# Patient Record
Sex: FEMALE | Race: WHITE | URBAN - METROPOLITAN AREA
[De-identification: names, ages, dates, MRNs, and addresses within clinical notes are randomized per-mention and may not be internally consistent; named-entity substitution may affect disease eponyms.]

---

## 2017-03-06 ENCOUNTER — IMPORTED ENCOUNTER (OUTPATIENT)
Dept: URBAN - METROPOLITAN AREA CLINIC 1 | Facility: CLINIC | Age: 67
End: 2017-03-06

## 2017-03-06 PROBLEM — H40.1132: Noted: 2017-03-06

## 2017-03-06 PROBLEM — H16.223: Noted: 2017-03-06

## 2017-03-06 PROBLEM — Z96.1: Noted: 2017-03-06

## 2017-03-06 PROCEDURE — 92083 EXTENDED VISUAL FIELD XM: CPT

## 2017-03-06 PROCEDURE — 92012 INTRM OPH EXAM EST PATIENT: CPT

## 2017-03-06 NOTE — PATIENT DISCUSSION
1.  Moderate Open Angle Glaucoma OU (CD 0.75/0.65) IOP stable on Latanoprost. VF shows no progression OU. Cont Latanoprost QHS OU. Compliance with meds. 2.  Keratoconjunctivitis Sicca OU (H/o Plugs LLs OU) Pt remains symptomatic despite Restasis use and ATs. Cont Refresh PM jyoti QHS OU. Cont ATs Q2hrs OU w/a (can also use Systane Gel gtts during the day) Could consider UL Microflow Plugs if patient remains symptomatic or if gel gtts do not improve symptoms. 3.  Pseudophakia OU - (Standard w/ LRI OU) 4. H/o HTN Retinopathy OU 5. H/o PVD OD Return for an appointment in 6 mo 30 OCT with Dr. Jina Ramos.

## 2017-09-15 ENCOUNTER — IMPORTED ENCOUNTER (OUTPATIENT)
Dept: URBAN - METROPOLITAN AREA CLINIC 1 | Facility: CLINIC | Age: 67
End: 2017-09-15

## 2017-09-15 PROBLEM — H16.223: Noted: 2017-09-15

## 2017-09-15 PROBLEM — H40.1132: Noted: 2017-09-15

## 2017-09-15 PROCEDURE — 92014 COMPRE OPH EXAM EST PT 1/>: CPT

## 2017-09-15 PROCEDURE — 92133 CPTRZD OPH DX IMG PST SGM ON: CPT

## 2017-09-15 PROCEDURE — 92015 DETERMINE REFRACTIVE STATE: CPT

## 2017-09-15 NOTE — PATIENT DISCUSSION
1.  Moderate Open Angle Glaucoma OU (CD 0.75/0.65) IOP stable on Latanoprost.OCT shows no progression OU. Cont Latanoprost QHS OU. Compliance with meds. 2.  Keratoconjunctivitis Sicca OU (H/o Plugs LLs OU) Cont Restasis BID OU. Cont Refresh PM jyoti QHS OU. Cont ATs Q2hrs OU w/a (can also use Systane Gel gtts during the day) Could consider UL Microflow Plugs if patient remains symptomatic or if gel gtts do not improve symptoms. 3.  Pseudophakia OU - (Standard w/ LRI OU) 4. GI Arth Vascular Disease- Stable observe. 5.  PVD OD - Stable RD precautions Letter to PCP Return for an appointment in 6 mo 10 VF 24-2 OU with Dr. Judy Juares.

## 2017-12-19 NOTE — PATIENT DISCUSSION
REFRACTIVE ERROR, OU - DISCUSSED OPTION OF CORRECTING AT THE TIME OF CATARACT SURGERY. PT UNDERSTANDS AND ELECTS TO CONSIDER HER OPTIONS.  DISCUSSED POSSIBLY BEING A CANDIDATE FOR THE PANOPTICS STUDY

## 2017-12-19 NOTE — PATIENT DISCUSSION
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 The possibility of the recurrence of the problem leading to permanent damage to the cornea and vision loss was also explained. The risks, benefits and alternatives of epilation were reviewed with the patient. The patient understands and wishes to proceed with epliation today.

## 2017-12-19 NOTE — PATIENT DISCUSSION
Surgery  Counseling: I have discussed the option of glasses versus cataract surgery versus following. It was explained that when vision no longer meets the patient's visual needs and a new prescription for glasses is not likely to improve the patient's visual symptoms, the option of cataract surgery is a reasonable next step. It was explained that there is no guarantee that removing the cataract will improve their visual symptoms, however; it is believed that the cataract is contributing to the patient's visual impairment and surgery may significantly improve both the visual and functional status of the patient. The risks, benefits and alternatives of surgery were discussed with the patient. After this discussion, the patient desires to proceed with cataract surgery with implantation of an intraocular lens to improve vision for reading small print, reducing glare while driving at night and being able to see street signs.

## 2018-01-18 NOTE — PATIENT DISCUSSION
Surgery  Counseling: I have discussed the option of glasses versus cataract surgery versus following. It was explained that when vision no longer meets the patient's visual needs and a new prescription for glasses is not likely to improve the patient's visual symptoms, the option of cataract surgery is a reasonable next step. It was explained that there is no guarantee that removing the cataract will improve their visual symptoms, however; it is believed that the cataract is contributing to the patient's visual impairment and surgery may significantly improve both the visual and functional status of the patient. The risks, benefits and alternatives of surgery were discussed with the patient. After this discussion, the patient desires to proceed with cataract surgery with implantation of an intraocular lens to improve vision to drive safely and read small print.

## 2018-03-16 ENCOUNTER — IMPORTED ENCOUNTER (OUTPATIENT)
Dept: URBAN - METROPOLITAN AREA CLINIC 1 | Facility: CLINIC | Age: 68
End: 2018-03-16

## 2018-03-16 PROBLEM — H16.223: Noted: 2018-03-16

## 2018-03-16 PROBLEM — H40.1132: Noted: 2018-03-16

## 2018-03-16 PROCEDURE — 92012 INTRM OPH EXAM EST PATIENT: CPT

## 2018-03-16 PROCEDURE — 92083 EXTENDED VISUAL FIELD XM: CPT

## 2018-03-16 NOTE — PATIENT DISCUSSION
1.  Moderate Open Angle Glaucoma OU (CD 0.75/0.65) IOP stable on Latanoprost. VF shows no progression OU. Cont Latanoprost QHS OU. Compliance with meds. 2.  Keratoconjunctivitis Sicca OU (H/o Plugs LLs OU) Cont Restasis BID OU. Cont Refresh PM jyoti QHS OU. Cont ATs Q2hrs OU w/a (can also use Systane Gel gtts during the day) 3. Pseudophakia OU - (Standard w/ LRI OU) 4. H/o GI Arth Vascular Disease 5. H/o PVD ODReturn for an appointment in 6 mo 27 OCT with Dr. Carole Ortega.

## 2018-09-17 ENCOUNTER — IMPORTED ENCOUNTER (OUTPATIENT)
Dept: URBAN - METROPOLITAN AREA CLINIC 1 | Facility: CLINIC | Age: 68
End: 2018-09-17

## 2018-09-17 PROBLEM — H35.013: Noted: 2018-09-17

## 2018-09-17 PROBLEM — H04.123: Noted: 2018-09-17

## 2018-09-17 PROBLEM — Z96.1: Noted: 2018-09-17

## 2018-09-17 PROBLEM — H16.143: Noted: 2018-09-17

## 2018-09-17 PROBLEM — H40.1132: Noted: 2018-09-17

## 2018-09-17 PROCEDURE — 92014 COMPRE OPH EXAM EST PT 1/>: CPT

## 2018-09-17 PROCEDURE — 92015 DETERMINE REFRACTIVE STATE: CPT

## 2018-09-17 PROCEDURE — 92133 CPTRZD OPH DX IMG PST SGM ON: CPT

## 2018-09-17 NOTE — PATIENT DISCUSSION
1.  Moderate Open Angle Glaucoma OU (0.75/0.65)- Stable IOP OU. Minimal thinning by OCT OU no progression. Patient to switch from Latanoprost to Travatan Z OU QHS due to insurance reasons. Patient advised to be compliant with gtts. Condition was discussed with patient and patient understands. Will continue to monitor patient for any progression in condition. Patient was advised to call us with any problems questions or concerns. 2.  CLAUDIA w/ PEK OU- Slight progression OU. The continuation of artificial tears OU Q2hwa were recommended. Plug intact LLs OU. Continue Restasis OU BID. Continue Refresh PM OSWALDO OU QHS. 3. GR I Athero Vascular Dz OU- Stable. Observe. 4.  Pseudophakia OU - Doing well. 5. Return for an appointment for 10/HVf in 6 months with Dr. Carole Ortega.

## 2019-02-19 NOTE — PATIENT DISCUSSION
Resume normal activity. Resume any medications that were discontinued for surgery. Artificial tears prn burning/itching/blurry vision. Wash incisions/ lash line once daily with baby shampoo. Sutures removed without difficulty. Discussed skin care and sun avoidance at length. Reviewed results of pathology report. Follow up in one month.

## 2019-03-18 ENCOUNTER — IMPORTED ENCOUNTER (OUTPATIENT)
Dept: URBAN - METROPOLITAN AREA CLINIC 1 | Facility: CLINIC | Age: 69
End: 2019-03-18

## 2019-03-18 PROBLEM — H16.143: Noted: 2019-03-18

## 2019-03-18 PROBLEM — H40.1132: Noted: 2019-03-18

## 2019-03-18 PROBLEM — Z96.1: Noted: 2019-03-18

## 2019-03-18 PROBLEM — H04.123: Noted: 2019-03-18

## 2019-03-18 PROCEDURE — 99213 OFFICE O/P EST LOW 20 MIN: CPT

## 2019-03-18 PROCEDURE — 92083 EXTENDED VISUAL FIELD XM: CPT

## 2019-03-18 NOTE — PATIENT DISCUSSION
1.  Moderate Open Angle Glaucoma OU (CD 0.75/0.65) - HVF shows non specific loss OU. IOP stable cont Latanoprost QHS OU. Patient advised to be compliant with gtts. Condition was discussed with patient and patient understands. Will continue to monitor patient for any progression in condition. Patient was advised to call us with any problems questions or concerns. 2.  CLAUDIA w/ PEK OU- Recommend PF Blink ATs TID-QID OU routinely and cont AT Gel QHS OU 3. Pseudophakia OU - Doing Well 4. H/o GI Arth Vascular Disease 5. H/o PVD ODReturn for an appointment in 6 months 30/OCT with Dr. Sriram Michelle.

## 2019-03-26 NOTE — PATIENT DISCUSSION
Patient is continuing to heal well following surgery. Reviewed skin care and sun avoidance. Eye cream given. Reviewed the results of the path report with the patient today. Will have patient follow up w/ Dr. Alexandria Flores to examine the LLL. Follow up prn.

## 2019-07-05 NOTE — PATIENT DISCUSSION
Patient is continuing to heal well following surgery. Reviewed skin care and sun avoidance. Follow up prn.

## 2019-09-20 ENCOUNTER — IMPORTED ENCOUNTER (OUTPATIENT)
Dept: URBAN - METROPOLITAN AREA CLINIC 1 | Facility: CLINIC | Age: 69
End: 2019-09-20

## 2019-09-20 PROBLEM — H35.013: Noted: 2019-09-20

## 2019-09-20 PROBLEM — H40.1132: Noted: 2019-09-20

## 2019-09-20 PROBLEM — H16.143: Noted: 2019-09-20

## 2019-09-20 PROBLEM — H43.813: Noted: 2019-09-20

## 2019-09-20 PROBLEM — H04.123: Noted: 2019-09-20

## 2019-09-20 PROBLEM — Z96.1: Noted: 2019-09-20

## 2019-09-20 PROCEDURE — 92015 DETERMINE REFRACTIVE STATE: CPT

## 2019-09-20 PROCEDURE — 92014 COMPRE OPH EXAM EST PT 1/>: CPT

## 2019-09-20 PROCEDURE — 92133 CPTRZD OPH DX IMG PST SGM ON: CPT

## 2019-09-20 NOTE — PATIENT DISCUSSION
1.  Moderate Open Angle Glaucoma OU -- (CD 0.75/0.65)  Stable IOP OU. Minimal thinning by OCT OU no progression. Continue Latanoprost QHS OU. Patient advised to be compliant with gtts. Condition was discussed with patient and patient understands. Will continue to monitor patient for any progression in condition. Patient was advised to call us with any problems questions or concerns. 2.  CLAUDIA w/ PEK OU -- Switching to PF ATs [id number] OU. Plug intact LLs OU. Continue Restasis OU BID. Recommend PM OSWALDO QHS OU. (Sample of PF Blink Refresh Celluvisc and PF Soothe XP)3. GR I Athero Vascular Dz OU- Stable. Observe. 4.  PVD OD -- Old stable. RD precautions. 5. Pseudophakia OU -- Doing well. Return for an appointment for 10/HVF in 6 months with Dr. Tyler Fernandez.

## 2020-07-13 ENCOUNTER — IMPORTED ENCOUNTER (OUTPATIENT)
Dept: URBAN - METROPOLITAN AREA CLINIC 1 | Facility: CLINIC | Age: 70
End: 2020-07-13

## 2020-07-13 PROBLEM — H40.1132: Noted: 2020-07-13

## 2020-07-13 PROCEDURE — 92012 INTRM OPH EXAM EST PATIENT: CPT

## 2020-07-13 PROCEDURE — 92083 EXTENDED VISUAL FIELD XM: CPT

## 2020-07-13 NOTE — PATIENT DISCUSSION
CLAUDIA w/ PEK OU - (LL plugs in place OU) Continue Restasis BID OU. Recommend PF ATs P2uunca OU. Recommend PM OSWALDO QHS OU. 3.  Pseudophakia OU - Doing well. 4. H/o GR I Athero Vascular Dz OU5. H/o PVD ODReturn for an appointment in 6 months 30/OCT with Dr. Tyler Fernandez.

## 2020-07-13 NOTE — PATIENT DISCUSSION
1.  Moderate Open Angle Glaucoma OU (CD 0.75/0.65) - HVF WNL OU. IOP stable cont Latanoprost QHS OU. Patient advised to be compliant with gtts. Condition was discussed with patient and patient understands. Will continue to monitor patient for any progression in condition. Patient was advised to call us with any problems questions or concerns. 2.  CLAUDIA w/ PEK OU - (LL plugs in place OU) Continue Restasis BID OU. Recommend PF ATs [id number] OU. Recommend PM OSWALDO QHS OU. 3.  Pseudophakia OU - Doing well. 4. H/o GR I Athero Vascular Dz OU5. H/o PVD ODReturn for an appointment in 6 months 30/OCT with Dr. Augustine Finley.

## 2021-01-26 ENCOUNTER — IMPORTED ENCOUNTER (OUTPATIENT)
Dept: URBAN - METROPOLITAN AREA CLINIC 1 | Facility: CLINIC | Age: 71
End: 2021-01-26

## 2021-01-26 PROBLEM — H40.1131: Noted: 2021-01-26

## 2021-01-26 PROBLEM — H16.143: Noted: 2021-01-26

## 2021-01-26 PROBLEM — H04.123: Noted: 2021-01-26

## 2021-01-26 PROCEDURE — 92133 CPTRZD OPH DX IMG PST SGM ON: CPT

## 2021-01-26 PROCEDURE — 92014 COMPRE OPH EXAM EST PT 1/>: CPT

## 2021-01-26 NOTE — PATIENT DISCUSSION
1.  Mild Open Angle Glaucoma OU (CD 0.75/0.65) - No progression by OCT OU. IOP stable cont Latanoprost QHS OU. Patient advised to be compliant with gtts. Condition was discussed with patient and patient understands. Will continue to monitor patient for any progression in condition. Patient was advised to call us with any problems questions or concerns. 2.  CLAUDIA w/ PEK OU - (LL plugs in place OU) PEK increased OU. Patient d/c'd Restasis secondary to cost. Recommend PF ATs [id number] OU compliance urged. and PM OSWALDO QHS OU. 3.  Pseudophakia OU - Doing well. 4. GR I Athero Vascular Dz OU- Stable. 5. PVD OD -- RD precautions. Return for an appointment in 6 months 10/HVF 24-2 with Dr. Cal Ortiz.

## 2021-10-05 ENCOUNTER — IMPORTED ENCOUNTER (OUTPATIENT)
Dept: URBAN - METROPOLITAN AREA CLINIC 1 | Facility: CLINIC | Age: 71
End: 2021-10-05

## 2021-10-05 PROBLEM — H04.123: Noted: 2021-10-05

## 2021-10-05 PROBLEM — H16.143: Noted: 2021-10-05

## 2021-10-05 PROBLEM — H26.493: Noted: 2021-10-05

## 2021-10-05 PROBLEM — H40.1131: Noted: 2021-10-05

## 2021-10-05 PROCEDURE — 99213 OFFICE O/P EST LOW 20 MIN: CPT

## 2021-10-05 PROCEDURE — 92083 EXTENDED VISUAL FIELD XM: CPT

## 2021-10-05 PROCEDURE — 92015 DETERMINE REFRACTIVE STATE: CPT

## 2021-10-05 NOTE — PATIENT DISCUSSION
1.  Mild Open Angle Glaucoma OU (CD 0.75/0.65) - HVF WNL OU. IOP stable continue Latanoprost QHS OU. Patient advised to be compliant with gtts. Condition was discussed with patient and patient understands. Will continue to monitor patient for any progression in condition. Patient was advised to call us with any problems questions or concerns. 2.  CLAUDIA w/ PEK OU- Recommend PF ATs QID-Q2H OU routinely. Will try Restasis BID OU again (erx)3. PCO OU- Visually Significant *secondary to glare*; schedule YAG Cap OD then OS. Pros cons risks and benefits. 4.  Pseudophakia OU - Doing well. 5. GR I Athero Vascular Dz OU- Stable. 6. PVD OD - RD precautions. Return for an appointment in YAG Cap OD then OS with Dr. Janki Billingsley. Return for an appointment in 30/OCT post 90 days after YAGs (check ks on Restasis) with Dr. Janki Billingsley.

## 2021-10-15 ENCOUNTER — IMPORTED ENCOUNTER (OUTPATIENT)
Dept: URBAN - METROPOLITAN AREA CLINIC 1 | Facility: CLINIC | Age: 71
End: 2021-10-15

## 2021-10-15 PROBLEM — H26.491: Noted: 2021-10-15

## 2021-10-15 PROCEDURE — 66821 AFTER CATARACT LASER SURGERY: CPT

## 2021-11-05 ENCOUNTER — IMPORTED ENCOUNTER (OUTPATIENT)
Dept: URBAN - METROPOLITAN AREA CLINIC 1 | Facility: CLINIC | Age: 71
End: 2021-11-05

## 2021-11-05 PROBLEM — H26.492: Noted: 2021-11-05

## 2021-11-05 PROCEDURE — 66821 AFTER CATARACT LASER SURGERY: CPT

## 2021-11-05 NOTE — PATIENT DISCUSSION
YAG CAP OS: (Consent signed and scanned into attachments) 1 gtt Prolensa applied. The purpose and nature of the procedure possible alternative methods of treatment the risks involved and the possibility of complications were discussed with patient. The Patient wishes to proceed and the consent was signed. The laser was then performed under topical anesthesia with no complications. Post op instructions were given to patient as well as a follow-up appointment. Patient was advised to call our office if any questions or concerns. Return for an appointment in 1-4 week PO/YAG with Dr. Harini Medina.

## 2021-12-14 ENCOUNTER — IMPORTED ENCOUNTER (OUTPATIENT)
Dept: URBAN - METROPOLITAN AREA CLINIC 1 | Facility: CLINIC | Age: 71
End: 2021-12-14

## 2021-12-14 ENCOUNTER — PREPPED CHART (OUTPATIENT)
Dept: URBAN - METROPOLITAN AREA CLINIC 1 | Facility: CLINIC | Age: 71
End: 2021-12-14

## 2021-12-14 PROCEDURE — 99024 POSTOP FOLLOW-UP VISIT: CPT

## 2021-12-14 NOTE — PATIENT DISCUSSION
1. PO YAG Cap OU: good result. MRX given.  As scheduled February for a 30/OCT (check K's on Restasis new start)

## 2022-02-12 ASSESSMENT — TONOMETRY
OS_IOP_MMHG: 13
OD_IOP_MMHG: 13

## 2022-02-12 ASSESSMENT — VISUAL ACUITY
OS_SC: 20/25
OD_SC: 20/25

## 2022-02-14 ENCOUNTER — COMPREHENSIVE EXAM (OUTPATIENT)
Dept: URBAN - METROPOLITAN AREA CLINIC 1 | Facility: CLINIC | Age: 72
End: 2022-02-14

## 2022-02-14 DIAGNOSIS — H40.1131: ICD-10-CM

## 2022-02-14 DIAGNOSIS — Z96.1: ICD-10-CM

## 2022-02-14 DIAGNOSIS — H04.123: ICD-10-CM

## 2022-02-14 DIAGNOSIS — H35.033: ICD-10-CM

## 2022-02-14 DIAGNOSIS — Z98.890: ICD-10-CM

## 2022-02-14 DIAGNOSIS — H43.811: ICD-10-CM

## 2022-02-14 PROCEDURE — 92014 COMPRE OPH EXAM EST PT 1/>: CPT

## 2022-02-14 PROCEDURE — 92015 DETERMINE REFRACTIVE STATE: CPT

## 2022-02-14 PROCEDURE — 92133 CPTRZD OPH DX IMG PST SGM ON: CPT

## 2022-02-14 ASSESSMENT — TONOMETRY
OD_IOP_MMHG: 12
OS_IOP_MMHG: 12

## 2022-02-14 ASSESSMENT — VISUAL ACUITY
OS_CC: 20/25
OD_CC: 20/25

## 2022-02-14 NOTE — PATIENT DISCUSSION
(CD 0.75/0.65) No significant change by OCT. IOP stable. Continue Latanoprost QHS OU. Patient to continue with current gtt regimen. Patient advised to be compliant with gtts. Condition was discussed with patient and patient understands. Will continue to monitor patient for any progression in condition. Patient was advised to call us with any problems, questions, or concerns.

## 2022-04-08 ASSESSMENT — TONOMETRY
OD_IOP_MMHG: 12
OD_IOP_MMHG: 13
OS_IOP_MMHG: 14
OS_IOP_MMHG: 13
OD_IOP_MMHG: 13
OS_IOP_MMHG: 13
OS_IOP_MMHG: 13
OS_IOP_MMHG: 14
OS_IOP_MMHG: 14
OD_IOP_MMHG: 14
OS_IOP_MMHG: 13
OS_IOP_MMHG: 13
OD_IOP_MMHG: 13
OD_IOP_MMHG: 13
OS_IOP_MMHG: 13
OD_IOP_MMHG: 14
OD_IOP_MMHG: 11
OD_IOP_MMHG: 14
OS_IOP_MMHG: 13
OD_IOP_MMHG: 13

## 2022-04-08 ASSESSMENT — VISUAL ACUITY
OS_SC: 20/25
OS_SC: 20/20-2
OS_GLARE: 20/80
OS_SC: 20/20
OD_SC: 20/25
OS_SC: 20/25
OD_CC: J1
OD_GLARE: 20/100
OD_CC: J1
OS_SC: 20/25
OD_SC: 20/40
OS_CC: J1
OD_SC: 20/40
OS_CC: J1
OS_CC: J1
OS_SC: 20/20
OS_SC: 20/25
OD_SC: 20/25
OS_SC: 20/25-2
OD_SC: 20/25
OD_SC: 20/25
OS_SC: 20/20
OD_SC: 20/25
OD_SC: 20/25
OD_CC: J2
OD_SC: 20/40

## 2022-08-16 ENCOUNTER — FOLLOW UP (OUTPATIENT)
Dept: URBAN - METROPOLITAN AREA CLINIC 1 | Facility: CLINIC | Age: 72
End: 2022-08-16

## 2022-08-16 DIAGNOSIS — H40.1131: ICD-10-CM

## 2022-08-16 DIAGNOSIS — H04.123: ICD-10-CM

## 2022-08-16 PROCEDURE — 92083 EXTENDED VISUAL FIELD XM: CPT

## 2022-08-16 PROCEDURE — 92012 INTRM OPH EXAM EST PATIENT: CPT

## 2022-08-16 ASSESSMENT — VISUAL ACUITY
OS_CC: 20/25
OS_CC: J2
OD_CC: 20/25
OD_CC: J2

## 2022-08-16 ASSESSMENT — TONOMETRY
OS_IOP_MMHG: 13
OD_IOP_MMHG: 13

## 2022-08-16 NOTE — PATIENT DISCUSSION
No change in PEK. recommend compliance with PF ATs and increase to Q2Hrs OU, routinely. Continue Restasis BID OU.

## 2022-08-16 NOTE — PATIENT DISCUSSION
(CD 0.75/0.65) HVF OD Normal but OS poor quality. IOP stable at 13 today. Continue Latanoprost QHS OU. Patient to continue with current gtt regimen. Patient advised to be compliant with gtts. Condition was discussed with patient and patient understands. Will continue to monitor patient for any progression in condition. Patient was advised to call us with any problems, questions, or concerns.

## 2023-10-02 ENCOUNTER — FOLLOW UP (OUTPATIENT)
Dept: URBAN - METROPOLITAN AREA CLINIC 1 | Facility: CLINIC | Age: 73
End: 2023-10-02

## 2023-10-02 DIAGNOSIS — H40.1131: ICD-10-CM

## 2023-10-02 PROCEDURE — 92083 EXTENDED VISUAL FIELD XM: CPT

## 2023-10-02 PROCEDURE — 99213 OFFICE O/P EST LOW 20 MIN: CPT

## 2023-10-02 ASSESSMENT — VISUAL ACUITY
OD_CC: J1
OS_CC: 20/25
OS_CC: J1
OD_CC: 20/25

## 2023-10-02 ASSESSMENT — TONOMETRY
OS_IOP_MMHG: 12
OD_IOP_MMHG: 11

## 2024-04-08 ENCOUNTER — COMPREHENSIVE EXAM (OUTPATIENT)
Dept: URBAN - METROPOLITAN AREA CLINIC 1 | Facility: CLINIC | Age: 74
End: 2024-04-08

## 2024-04-08 DIAGNOSIS — Z96.1: ICD-10-CM

## 2024-04-08 DIAGNOSIS — H40.1131: ICD-10-CM

## 2024-04-08 PROCEDURE — 92015 DETERMINE REFRACTIVE STATE: CPT

## 2024-04-08 PROCEDURE — 92133 CPTRZD OPH DX IMG PST SGM ON: CPT

## 2024-04-08 PROCEDURE — 99214 OFFICE O/P EST MOD 30 MIN: CPT

## 2024-04-08 ASSESSMENT — VISUAL ACUITY
OS_CC: J2
OD_CC: 20/30
OD_CC: J2
OS_CC: 20/30

## 2024-04-08 ASSESSMENT — TONOMETRY
OS_IOP_MMHG: 14
OD_IOP_MMHG: 14

## 2024-07-29 ENCOUNTER — EMERGENCY VISIT (OUTPATIENT)
Dept: URBAN - METROPOLITAN AREA CLINIC 1 | Facility: CLINIC | Age: 74
End: 2024-07-29

## 2024-07-29 DIAGNOSIS — H01.026: ICD-10-CM

## 2024-07-29 PROCEDURE — 99213 OFFICE O/P EST LOW 20 MIN: CPT

## 2024-07-29 ASSESSMENT — VISUAL ACUITY
OS_SC: 20/40
OD_CC: 20/40-2
OU_SC: 20/40
OD_SC: 20/100
OU_CC: 20/25
OS_CC: 20/30
OD_SC: 20/60
OU_CC: 20/30
OU_SC: 20/70
OD_CC: 20/40-2
OS_CC: 20/40-2
OS_SC: 20/70

## 2024-07-29 ASSESSMENT — TONOMETRY
OD_IOP_MMHG: 12
OS_IOP_MMHG: 12

## 2024-10-10 ENCOUNTER — FOLLOW UP (OUTPATIENT)
Dept: URBAN - METROPOLITAN AREA CLINIC 1 | Facility: CLINIC | Age: 74
End: 2024-10-10

## 2024-10-10 DIAGNOSIS — H16.143: ICD-10-CM

## 2024-10-10 DIAGNOSIS — H40.1131: ICD-10-CM

## 2024-10-10 DIAGNOSIS — H04.123: ICD-10-CM

## 2024-10-10 PROCEDURE — 99213 OFFICE O/P EST LOW 20 MIN: CPT

## 2025-04-11 ENCOUNTER — COMPREHENSIVE EXAM (OUTPATIENT)
Age: 75
End: 2025-04-11

## 2025-04-11 DIAGNOSIS — H04.123: ICD-10-CM

## 2025-04-11 DIAGNOSIS — H35.033: ICD-10-CM

## 2025-04-11 DIAGNOSIS — H40.1131: ICD-10-CM

## 2025-04-11 PROCEDURE — 92133 CPTRZD OPH DX IMG PST SGM ON: CPT

## 2025-04-11 PROCEDURE — 99214 OFFICE O/P EST MOD 30 MIN: CPT
